# Patient Record
Sex: MALE | Race: WHITE | Employment: UNEMPLOYED | URBAN - METROPOLITAN AREA
[De-identification: names, ages, dates, MRNs, and addresses within clinical notes are randomized per-mention and may not be internally consistent; named-entity substitution may affect disease eponyms.]

---

## 2019-11-06 ENCOUNTER — HOSPITAL ENCOUNTER (OUTPATIENT)
Dept: LAB | Age: 59
Discharge: HOME | End: 2019-11-06

## 2019-11-06 ENCOUNTER — OFFICE VISIT (OUTPATIENT)
Dept: FAMILY MEDICINE CLINIC | Age: 59
End: 2019-11-06

## 2019-11-06 VITALS
DIASTOLIC BLOOD PRESSURE: 75 MMHG | WEIGHT: 315 LBS | SYSTOLIC BLOOD PRESSURE: 130 MMHG | HEART RATE: 86 BPM | TEMPERATURE: 98.4 F | OXYGEN SATURATION: 95 % | RESPIRATION RATE: 16 BRPM | BODY MASS INDEX: 44.1 KG/M2 | HEIGHT: 71 IN

## 2019-11-06 DIAGNOSIS — I10 ESSENTIAL HYPERTENSION: Primary | ICD-10-CM

## 2019-11-06 DIAGNOSIS — E66.01 OBESITY, MORBID (HCC): ICD-10-CM

## 2019-11-06 DIAGNOSIS — Z71.3 ENCOUNTER FOR WEIGHT LOSS COUNSELING: ICD-10-CM

## 2019-11-06 LAB
ALBUMIN SERPL-MCNC: 3.6 G/DL (ref 3.5–5)
ALBUMIN/GLOB SERPL: 0.9 {RATIO} (ref 1.1–2.2)
ALP SERPL-CCNC: 92 U/L (ref 45–117)
ALT SERPL-CCNC: 63 U/L (ref 12–78)
ANION GAP SERPL CALC-SCNC: 5 MMOL/L (ref 5–15)
AST SERPL-CCNC: 27 U/L (ref 15–37)
BILIRUB SERPL-MCNC: 0.4 MG/DL (ref 0.2–1)
BUN SERPL-MCNC: 12 MG/DL (ref 6–20)
BUN/CREAT SERPL: 13 (ref 12–20)
CALCIUM SERPL-MCNC: 9.5 MG/DL (ref 8.5–10.1)
CHLORIDE SERPL-SCNC: 104 MMOL/L (ref 97–108)
CHOLEST SERPL-MCNC: 184 MG/DL
CO2 SERPL-SCNC: 30 MMOL/L (ref 21–32)
CREAT SERPL-MCNC: 0.96 MG/DL (ref 0.7–1.3)
ERYTHROCYTE [DISTWIDTH] IN BLOOD BY AUTOMATED COUNT: 13.8 % (ref 11.5–14.5)
GLOBULIN SER CALC-MCNC: 3.8 G/DL (ref 2–4)
GLUCOSE SERPL-MCNC: 109 MG/DL (ref 65–100)
HCT VFR BLD AUTO: 43.8 % (ref 36.6–50.3)
HDLC SERPL-MCNC: 30 MG/DL
HDLC SERPL: 6.1 {RATIO} (ref 0–5)
HGB BLD-MCNC: 13.6 G/DL (ref 12.1–17)
LDLC SERPL CALC-MCNC: 107.4 MG/DL (ref 0–100)
LIPID PROFILE,FLP: ABNORMAL
MAGNESIUM SERPL-MCNC: 2.2 MG/DL (ref 1.6–2.4)
MCH RBC QN AUTO: 29.2 PG (ref 26–34)
MCHC RBC AUTO-ENTMCNC: 31.1 G/DL (ref 30–36.5)
MCV RBC AUTO: 94 FL (ref 80–99)
NRBC # BLD: 0 K/UL (ref 0–0.01)
NRBC BLD-RTO: 0 PER 100 WBC
PLATELET # BLD AUTO: 324 K/UL (ref 150–400)
PMV BLD AUTO: 10.6 FL (ref 8.9–12.9)
POTASSIUM SERPL-SCNC: 4.5 MMOL/L (ref 3.5–5.1)
PROT SERPL-MCNC: 7.4 G/DL (ref 6.4–8.2)
RBC # BLD AUTO: 4.66 M/UL (ref 4.1–5.7)
SODIUM SERPL-SCNC: 139 MMOL/L (ref 136–145)
TRIGL SERPL-MCNC: 233 MG/DL (ref ?–150)
TSH SERPL DL<=0.05 MIU/L-ACNC: 0.73 UIU/ML (ref 0.36–3.74)
URATE SERPL-MCNC: 5.8 MG/DL (ref 3.5–7.2)
VLDLC SERPL CALC-MCNC: 46.6 MG/DL
WBC # BLD AUTO: 7.7 K/UL (ref 4.1–11.1)

## 2019-11-06 PROCEDURE — 80061 LIPID PANEL: CPT

## 2019-11-06 PROCEDURE — 83735 ASSAY OF MAGNESIUM: CPT

## 2019-11-06 PROCEDURE — 85027 COMPLETE CBC AUTOMATED: CPT

## 2019-11-06 PROCEDURE — 84550 ASSAY OF BLOOD/URIC ACID: CPT

## 2019-11-06 PROCEDURE — 84443 ASSAY THYROID STIM HORMONE: CPT

## 2019-11-06 PROCEDURE — 80053 COMPREHEN METABOLIC PANEL: CPT

## 2019-11-06 NOTE — PROGRESS NOTES
Chief Complaint   Patient presents with    Other     discuss alternative for weight loss      Blood pressure 130/75, pulse 86, temperature 98.4 °F (36.9 °C), temperature source Oral, resp. rate 16, height 5' 11\" (1.803 m), weight 328 lb 6.4 oz (149 kg), SpO2 95 %. 1. Have you been to the ER, urgent care clinic since your last visit? Hospitalized since your last visit? No    2. Have you seen or consulted any other health care providers outside of the [address] [place] [address] since your last visit? Include any pap smears or colon screening.  No

## 2019-11-06 NOTE — PATIENT INSTRUCTIONS
Body Mass Index: Care Instructions  Your Care Instructions    Body mass index (BMI) can help you see if your weight is raising your risk for health problems. It uses a formula to compare how much you weigh with how tall you are. · A BMI lower than 18.5 is considered underweight. · A BMI between 18.5 and 24.9 is considered healthy. · A BMI between 25 and 29.9 is considered overweight. A BMI of 30 or higher is considered obese. If your BMI is in the normal range, it means that you have a lower risk for weight-related health problems. If your BMI is in the overweight or obese range, you may be at increased risk for weight-related health problems, such as high blood pressure, heart disease, stroke, arthritis or joint pain, and diabetes. If your BMI is in the underweight range, you may be at increased risk for health problems such as fatigue, lower protection (immunity) against illness, muscle loss, bone loss, hair loss, and hormone problems. BMI is just one measure of your risk for weight-related health problems. You may be at higher risk for health problems if you are not active, you eat an unhealthy diet, or you drink too much alcohol or use tobacco products. Follow-up care is a key part of your treatment and safety. Be sure to make and go to all appointments, and call your doctor if you are having problems. It's also a good idea to know your test results and keep a list of the medicines you take. How can you care for yourself at home? · Practice healthy eating habits. This includes eating plenty of fruits, vegetables, whole grains, lean protein, and low-fat dairy. · If your doctor recommends it, get more exercise. Walking is a good choice. Bit by bit, increase the amount you walk every day. Try for at least 30 minutes on most days of the week. · Do not smoke. Smoking can increase your risk for health problems. If you need help quitting, talk to your doctor about stop-smoking programs and medicines. These can increase your chances of quitting for good. · Limit alcohol to 2 drinks a day for men and 1 drink a day for women. Too much alcohol can cause health problems. If you have a BMI higher than 25  · Your doctor may do other tests to check your risk for weight-related health problems. This may include measuring the distance around your waist. A waist measurement of more than 40 inches in men or 35 inches in women can increase the risk of weight-related health problems. · Talk with your doctor about steps you can take to stay healthy or improve your health. You may need to make lifestyle changes to lose weight and stay healthy, such as changing your diet and getting regular exercise. If you have a BMI lower than 18.5  · Your doctor may do other tests to check your risk for health problems. · Talk with your doctor about steps you can take to stay healthy or improve your health. You may need to make lifestyle changes to gain or maintain weight and stay healthy, such as getting more healthy foods in your diet and doing exercises to build muscle. Where can you learn more? Go to http://micaela-андрей.info/. Enter S176 in the search box to learn more about \"Body Mass Index: Care Instructions. \"  Current as of: October 13, 2016  Content Version: 11.4  © 4565-0878 Healthwise, Incorporated. Care instructions adapted under license by [employer] (which disclaims liability or warranty for this information). If you have questions about a medical condition or this instruction, always ask your healthcare professional. [address] any warranty or liability for your use of this information.

## 2019-11-06 NOTE — PROGRESS NOTES
Chief Complaint   Patient presents with    Other     discuss alternative for weight loss        Subjective:   Alanna hCand is a 60 y.o. male here to discuss weight loss. He recently underwent spinal surgery at the [place] at the end of October. He states he was at the point of paralysis prior to this surgery and no the pain is improving and is able to ambulate with a cane. States  Max weight 340  Recent spinal surgery at end of October at [place]  Pain is improving  [facility] recommended bariatric surgery  Interested in medical assisted weight loss    Eating salsads and drinking lots of water    Past Medical History:   Diagnosis Date    Asthma     Asthma vs Reactive Airway Dz (diagnosed in [facility] following fugal respiratory infection).   Hx of abnormal chest -ray    Chronic back pain     MRI previously done 6/2013, Arthritis    Essential hypertension 6/13/2015    Hypogonadism male     Kidney stone     Low serum testosterone level     RANJITH (obstructive sleep apnea)     Wears a CPAP    Tinnitus      Past Surgical History:   Procedure Laterality Date    HX BACK SURGERY      10/21/19     Social History     Tobacco Use    Smoking status: Never Smoker    Smokeless tobacco: Never Used   Substance Use Topics    Alcohol use: No        Lab Results   Component Value Date/Time    WBC 7.7 11/06/2019 02:04 PM    HGB 13.6 11/06/2019 02:04 PM    HCT 43.8 11/06/2019 02:04 PM    PLATELET 354 77/69/2295 02:04 PM    MCV 94.0 11/06/2019 02:04 PM     Lab Results   Component Value Date/Time    Glucose 109 (H) 11/06/2019 02:04 PM    LDL, calculated 107.4 (H) 11/06/2019 02:04 PM    Creatinine 0.96 11/06/2019 02:04 PM      Lab Results   Component Value Date/Time    Cholesterol, total 184 11/06/2019 02:04 PM    HDL Cholesterol 30 11/06/2019 02:04 PM    LDL, calculated 107.4 (H) 11/06/2019 02:04 PM    Triglyceride 233 (H) 11/06/2019 02:04 PM    CHOL/HDL Ratio 6.1 (H) 11/06/2019 02:04 PM     Lab Results   Component Value Date/Time    ALT (SGPT) 63 11/06/2019 02:04 PM    AST (SGOT) 27 11/06/2019 02:04 PM    Alk. phosphatase 92 11/06/2019 02:04 PM    Bilirubin, total 0.4 11/06/2019 02:04 PM    Albumin 3.6 11/06/2019 02:04 PM    Protein, total 7.4 11/06/2019 02:04 PM    PLATELET 446 85/41/0207 02:04 PM     Lab Results   Component Value Date/Time    GFR est non-AA >60 11/06/2019 02:04 PM    GFR est AA >60 11/06/2019 02:04 PM    Creatinine 0.96 11/06/2019 02:04 PM    BUN 12 11/06/2019 02:04 PM    Sodium 139 11/06/2019 02:04 PM    Potassium 4.5 11/06/2019 02:04 PM    Chloride 104 11/06/2019 02:04 PM    CO2 30 11/06/2019 02:04 PM    Magnesium 2.2 11/06/2019 02:04 PM     Lab Results   Component Value Date/Time    TSH 0.73 11/06/2019 02:04 PM       No results found for: HBA1C, HGBE8, RWE1TTUM, ODC2IKJO      Review of Systems  A comprehensive review of systems was negative except for that written in the HPI. Objective:     Visit Vitals  /75   Pulse 86   Temp 98.4 °F (36.9 °C) (Oral)   Resp 16   Ht 5' 11\" (1.803 m)   Wt 328 lb 6.4 oz (149 kg)   SpO2 95%   BMI 45.80 kg/m²     Physical exam:   Physical Exam  Vitals signs reviewed. Constitutional:       General: He is not in acute distress. Appearance: He is well-developed. He is not diaphoretic. Comments: Morbidly obese   Eyes:      General: No scleral icterus. Conjunctiva/sclera: Conjunctivae normal.      Pupils: Pupils are equal, round, and reactive to light. Neck:      Musculoskeletal: Normal range of motion and neck supple. Thyroid: No thyromegaly. Cardiovascular:      Rate and Rhythm: Normal rate and regular rhythm. Heart sounds: Normal heart sounds. No murmur. No friction rub. No gallop. Pulmonary:      Effort: Pulmonary effort is normal. No respiratory distress. Breath sounds: Normal breath sounds. No wheezing, rhonchi or rales. Abdominal:      General: Bowel sounds are normal. There is no distension. Palpations: Abdomen is soft. Tenderness:  There is no tenderness. Musculoskeletal: Normal range of motion. General: Tenderness (back) present. Lymphadenopathy:      Cervical: No cervical adenopathy. Skin:     General: Skin is warm. Findings: No rash. Neurological:      Mental Status: He is alert and oriented to person, place, and time. Cranial Nerves: No cranial nerve deficit. Sensory: No sensory deficit. Motor: No abnormal muscle tone. Deep Tendon Reflexes: Reflexes are normal and symmetric. Psychiatric:         Behavior: Behavior normal.         Thought Content: Thought content normal.         Judgment: Judgment normal.           Assessment/Plan:     1. Essential hypertension  At goal BP today on 10mg lisinopril. 2. Obesity, morbid ([address]  Patient has multiple cco-morbid conditions, worsened by his obesity, including his chronic low back pain w/ associated neuropathy and hypertension. Is interested in medication-assisted weight loss. Discussed medical options. Based on his hx of hypertension, would like to avoid phentermine containing products. Does not have diabetes, though likely has some insulin-resistance. Reviewed products like Saxenda, however patient would like to trial oral medications initially. Will trial Contrave. Advised to keep dietary log between now and follow-up appointment. We will then be able to develop further smart goals. Overall goal is at least 10% weight loss (32lbs) ideally within the first 12 weeks of treatment. - naltrexone-buPROPion (CONTRAVE) 8-90 mg TbER ER tablet; Take 2 Tabs by mouth two (2) times a day. Week 1 1 tab PO QAM, Week 2 1QAM 1QHS, Week 3 2QAM 1 QHS, Week 4 & beyond 2QAM 2QHS  Dispense: 120 Tab; Refill: 1  - naltrexone-buPROPion (CONTRAVE) 8-90 mg TbER ER tablet;  First Month: Contrave 8mg/90mg #70  Week 1 : 1 Tab AM  Week 2 : 1 Tab AM, 1 Tab PM  Week 3 : 2 Tab AM, 1 Tab PM  Week 4 : 2 Tab AM, 2 Tab PM  Week 5 and Beyond: Contrave 8mg/90mg #120   2 Tab AM, 2 Tab PM  Dispense: 70 Tab; Refill: 0  - CBC W/O DIFF; Future  - METABOLIC PANEL, COMPREHENSIVE; Future  - URIC ACID; Future  - TSH 3RD GENERATION; Future  - LIPID PANEL; Future  - MAGNESIUM; Future    3. Encounter for weight loss counseling      I have discussed the diagnosis with the patient and the intended plan as seen in the above orders. he has expressed understanding. The patient has received an after-visit summary and questions were answered concerning future plans. I have discussed medication side effects and warnings with the patient as well. Follow-up and Dispositions    · Return in about 4 weeks (around 12/4/2019).          Electronically Signed: Regan St MD

## 2019-12-02 ENCOUNTER — TELEPHONE (OUTPATIENT)
Dept: FAMILY MEDICINE CLINIC | Age: 59
End: 2019-12-02

## 2019-12-02 DIAGNOSIS — Z71.3 ENCOUNTER FOR WEIGHT LOSS COUNSELING: ICD-10-CM

## 2019-12-02 DIAGNOSIS — E66.01 OBESITY, MORBID (HCC): Primary | ICD-10-CM

## 2019-12-11 NOTE — TELEPHONE ENCOUNTER
Dr. Kemar Rivera   Received: Today   Message Contents   Celia, Leticia Chalkokondili Message/Vendor Calls     Caller's first and last name: Melanie Jurado     Reason for call: waiting for Prior Authorization     Callback required yes/no and why: if necessary     Best contact number(s): [phone]     Details to clarify the request: Pt is waiting on prior authorization for Contrave and he wanted to know if he could get a Rx for Phentermine called into [facility] on file.      Maliha Mc

## 2019-12-11 NOTE — TELEPHONE ENCOUNTER
[facility] sent form to be completed for Contrave 8-90 mg. Placed form in Hayley's folder up front in black box.

## 2019-12-20 RX ORDER — PHENTERMINE HYDROCHLORIDE 37.5 MG/1
37.5 TABLET ORAL
Qty: 30 TAB | Refills: 0 | Status: SHIPPED | OUTPATIENT
Start: 2019-12-20 | End: 2020-01-08 | Stop reason: SDUPTHER

## 2019-12-20 NOTE — TELEPHONE ENCOUNTER
Please notify patient Rx sent to pharmacy. Will need to trial before Contrave. Will need to monitor blood pressure and notify the office if he is consistently above 140/90.

## 2019-12-23 ENCOUNTER — TELEPHONE (OUTPATIENT)
Dept: FAMILY MEDICINE CLINIC | Age: 59
End: 2019-12-23

## 2019-12-23 NOTE — TELEPHONE ENCOUNTER
----- Message from Raj Vallecillo sent at 12/23/2019  9:08 AM EST -----  Regarding: Dr. Bowen Novel: [id number] (if not patient): n/a  Relationship of caller (if not patient): n/a  Best contact number(s): [phone]  Name of medication and dosage if known: Pt forgot the name of the medication   Is patient out of this medication (yes/no): n/a  Pharmacy name: [facility] listed in chart? (yes/no): Yes  Pharmacy phone number: [phone]  Date of last visit: Wednesday, November 06, 2019 01:00 PM  Details to clarify the request: [facility] told pt to have his doctor contact them to verify prescription.

## 2020-02-03 NOTE — TELEPHONE ENCOUNTER
Patient aware his insurance wants him to try weight management for 6 months. Patient stated he does not want the medication anymore.

## 2020-02-10 ENCOUNTER — TELEPHONE (OUTPATIENT)
Dept: FAMILY MEDICINE CLINIC | Age: 60
End: 2020-02-10

## 2020-02-12 DIAGNOSIS — Z71.3 ENCOUNTER FOR WEIGHT LOSS COUNSELING: ICD-10-CM

## 2020-02-12 DIAGNOSIS — E66.01 OBESITY, MORBID (HCC): ICD-10-CM

## 2020-02-12 RX ORDER — PHENTERMINE HYDROCHLORIDE 37.5 MG/1
37.5 TABLET ORAL
Qty: 30 TAB | Refills: 0 | Status: SHIPPED | OUTPATIENT
Start: 2020-02-12 | End: 2020-03-16 | Stop reason: SDUPTHER

## 2020-02-12 NOTE — PROGRESS NOTES
Future Appointments   Date Time Provider Gabriel Maria Del Carmen   2/17/2020 11:10 AM Rahat Choi MD [facility] [address]     Refilled phentermine until patient able to follow-up.

## 2020-02-17 ENCOUNTER — OFFICE VISIT (OUTPATIENT)
Dept: FAMILY MEDICINE CLINIC | Age: 60
End: 2020-02-17

## 2020-02-17 VITALS
HEIGHT: 71 IN | WEIGHT: 315 LBS | TEMPERATURE: 98.2 F | OXYGEN SATURATION: 97 % | RESPIRATION RATE: 16 BRPM | BODY MASS INDEX: 44.1 KG/M2 | SYSTOLIC BLOOD PRESSURE: 139 MMHG | DIASTOLIC BLOOD PRESSURE: 76 MMHG | HEART RATE: 78 BPM

## 2020-02-17 DIAGNOSIS — Z71.3 ENCOUNTER FOR WEIGHT LOSS COUNSELING: Primary | ICD-10-CM

## 2020-02-17 DIAGNOSIS — E66.01 OBESITY, MORBID (HCC): ICD-10-CM

## 2020-02-17 RX ORDER — PREGABALIN 75 MG/1
CAPSULE ORAL
COMMUNITY
Start: 2020-02-13 | End: 2020-02-17 | Stop reason: ALTCHOICE

## 2020-02-17 RX ORDER — LISINOPRIL 20 MG/1
TABLET ORAL
COMMUNITY
Start: 2019-12-18

## 2020-02-17 RX ORDER — TIZANIDINE 4 MG/1
TABLET ORAL
COMMUNITY
Start: 2019-12-18

## 2020-02-17 RX ORDER — DULOXETIN HYDROCHLORIDE 30 MG/1
30 CAPSULE, DELAYED RELEASE ORAL DAILY
COMMUNITY

## 2020-02-17 NOTE — PROGRESS NOTES
Nasra Marmolejo  60 y.o. male  1960  Aria Porter  [facility] [address]: Progress Note  Sheyla Rea MD       Encounter Date: 2/17/2020    Chief Complaint   Patient presents with    Follow-up     weight management     History of Present Illness   Nasra Marmolejo is a 60 y.o. male who presents to clinic today for follow-up on weight. Had started phentermine since our last visit. States that he is down 8 lbs based on scale at home. He has cut out sodas, sweets and juices, increased salads, is eating 1-2 times day, eating chicken, fish, tuna and cut out breads. Trying to walk as far as he can, however he feels tingle and spikes in legs after a few blocks. Sees ortho next week. Pool more painful for him than walking. Wt Readings from Last 5 Encounters:   02/17/20 328 lb (148.8 kg)   11/06/19 328 lb 6.4 oz (149 kg)   10/10/16 327 lb (148.3 kg)   02/19/16 329 lb (149.2 kg)   07/28/15 305 lb 3.2 oz (138.4 kg)        Review of Systems   Review of Systems   Constitutional: Positive for weight loss. Negative for chills and fever. Eyes: Negative for blurred vision. Cardiovascular: Negative for chest pain, palpitations, orthopnea and leg swelling. Skin: Negative. Neurological: Negative for dizziness, tremors and headaches. Psychiatric/Behavioral: Negative for depression. The patient is not nervous/anxious. Vitals/Objective:     Vitals:    02/17/20 1101   BP: 139/76   Pulse: 78   Resp: 16   Temp: 98.2 °F (36.8 °C)   TempSrc: Oral   SpO2: 97%   Weight: 328 lb (148.8 kg)   Height: 5' 11\" (1.803 m)     Body mass index is 45.75 kg/m². General: Patient alert and oriented and in NAD  Heart: Regular rate and rhythm, No murmurs, rubs or gallops.   2+ peripheral pulses  Lungs: Clear to auscultation bilaterally, no wheezing, rales or rhonchi  Abd: +BS, non-tender, non-distended  Psych: Appropriate mood and affect      Assessment and Plan:   1. Encounter for weight loss counseling  Mr. Tylor Chen is being to make significant lifestyle changes that will be beneficial for his long term health. Most of these changes surround food, as his physical activity is limited by his back and leg pain. He will see ortho at the [place] shortly to address this. We discussed chair aerobics/exercises as a possible alternative to more intensive physical activity. Encouraged to continue his dietary changes. While weight here in clinic does not reflect any weight loss, he reports 8lbs since starting this medication. Advised setting SMART goals between now and the next visit and to work towards them. Has phentermine refill sent in on 2/12/2020. Will follow-up in 3-4 weeks. Discussed that if we do not have 10% weight loss within the first 12 months, we may need to look into alternative medications to assist in weight loss. SMART GOALS:    1. Track your eating over the next few weeks:  Mary Wang, or Norman    2. Increase physical activity to 3x/week over the next 4 weeks. 2. Obesity, morbid ([address]  Body mass index is 45.75 kg/m². I have discussed the diagnosis with the patient and the intended plan as seen in the above orders. he has expressed understanding. The patient has received an after-visit summary and questions were answered concerning future plans. I have discussed medication side effects and warnings with the patient as well. Electronically Signed: Alec Waldron MD     History   Patients past medical, surgical and family histories were reviewed and updated. Past Medical History:   Diagnosis Date    Asthma     Asthma vs Reactive Airway Dz (diagnosed in [facility] following fugal respiratory infection).   Hx of abnormal chest -ray    Chronic back pain     MRI previously done 6/2013, Arthritis    Essential hypertension 6/13/2015    Hypogonadism male     Kidney stone     Low serum testosterone level     RANJITH (obstructive sleep apnea)     Wears a CPAP    Tinnitus      Past Surgical History:   Procedure Laterality Date    HX BACK SURGERY      10/21/19     Family History   Problem Relation Age of Onset    Cancer Mother         ? Breast Cancer    Cancer Maternal Uncle         Prostate    Cancer Maternal Uncle         Colon Cancer    Cancer Maternal Uncle         Throat Cancer     Social History     Socioeconomic History    Marital status:      Spouse name: Rudy    Number of children: 3    Years of education: 12    Highest education level: Not on file   Occupational History    Occupation:      Comment: Stopped due to pain    Occupation: [employer]: Retired   Social Needs    Financial resource strain: Not on file    Food insecurity:     Worry: Not on file     Inability: Not on file   BUKA needs:     Medical: Not on file     Non-medical: Not on file   Tobacco Use    Smoking status: Never Smoker    Smokeless tobacco: Never Used   Substance and Sexual Activity    Alcohol use: No    Drug use: No    Sexual activity: Yes     Partners: Female   Lifestyle    Physical activity:     Days per week: Not on file     Minutes per session: Not on file    Stress: Not on file   Relationships    Social connections:     Talks on phone: Not on file     Gets together: Not on file     Attends Zoroastrianism service: Not on file     Active member of club or organization: Not on file     Attends meetings of clubs or organizations: Not on file     Relationship status: Not on file    Intimate partner violence:     Fear of current or ex partner: Not on file     Emotionally abused: Not on file     Physically abused: Not on file     Forced sexual activity: Not on file   Other Topics Concern    Not on file   Social History Narrative    ** Merged History Encounter **         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 Max Daily Amount: 37.5 mg. 30 Tab 0    lisinopril (PRINIVIL, ZESTRIL) 10 mg tablet TAKE 1 TABLET DAILY 90 Tab 2    fluticasone (FLONASE) 50 mcg/actuation nasal spray 2 Sprays by Both Nostrils route daily. 1 Bottle 4    albuterol (PROVENTIL HFA, VENTOLIN HFA, PROAIR HFA) 90 mcg/actuation inhaler Take 2 Puffs by inhalation every four (4) hours as needed for Wheezing. 1 Inhaler 2    cetirizine (ZYRTEC) 5 mg tablet Take 1 Tab by mouth daily. 30 Tab 0    gabapentin (NEURONTIN) 300 mg capsule Take 600 mg by mouth daily.        No Known Allergies

## 2020-02-17 NOTE — PROGRESS NOTES
Chief Complaint   Patient presents with    Medication Refill     phentermine     1. Have you been to the ER, urgent care clinic since your last visit? Hospitalized since your last visit? No    2. Have you seen or consulted any other health care providers outside of the [address] [place] [address] since your last visit? Include any pap smears or colon screening.  No

## 2020-02-17 NOTE — PATIENT INSTRUCTIONS
SMART GOALS:    1. Track your eating over the next few weeks:  Liang Buckley or Norman    2. Increase physical activity to 3x/week over the next 4 weeks.

## 2020-03-02 ENCOUNTER — TELEPHONE (OUTPATIENT)
Dept: FAMILY MEDICINE CLINIC | Age: 60
End: 2020-03-02

## 2020-03-02 NOTE — TELEPHONE ENCOUNTER
I called and spoke with patient and let him know to call back regarding medication request. I was unsure of what medication patient was needing. Expecting a call back.

## 2020-03-02 NOTE — TELEPHONE ENCOUNTER
Patient states he need refill of his\" stendra\" for erectile dysfunction. I don't see this medication on list and patient states Dr. Ender Flanagan has prescribed before.

## 2020-03-02 NOTE — TELEPHONE ENCOUNTER
Patient was given this medication my Dr Sandra Goetz and doesn't appear to be a current med and let him know that it is appropriate to reach out to him regarding refill. I did let him know that it would be a good idea to get a medical release of records so we have most current notes from that practice.

## 2020-03-16 DIAGNOSIS — Z71.3 ENCOUNTER FOR WEIGHT LOSS COUNSELING: ICD-10-CM

## 2020-03-16 DIAGNOSIS — E66.01 OBESITY, MORBID (HCC): ICD-10-CM

## 2020-03-16 RX ORDER — PHENTERMINE HYDROCHLORIDE 37.5 MG/1
37.5 TABLET ORAL
Qty: 30 TAB | Refills: 0 | Status: SHIPPED | OUTPATIENT
Start: 2020-03-16 | End: 2020-04-18

## 2020-03-16 NOTE — TELEPHONE ENCOUNTER
----- Message from Terrell Art sent at 3/16/2020  9:02 AM EDT -----  Regarding: Dr. Saravanan Robbins  Medication Refill    Caller (if not patient):      Relationship of caller (if not patient):      Best contact number(s):[phone]      Name of medication and dosage if known:\"Phentermine\" 37.5 mg      Is patient out of this medication (yes/no):yes      [facility] listed in chart? (yes/no):yes  Pharmacy phone number:      Details to clarify the request: Pt requested a refill on his Rx, and stated he is out of medication.       Terrell Art

## 2020-04-17 DIAGNOSIS — Z71.3 ENCOUNTER FOR WEIGHT LOSS COUNSELING: ICD-10-CM

## 2020-04-17 DIAGNOSIS — E66.01 OBESITY, MORBID (HCC): ICD-10-CM

## 2020-04-18 RX ORDER — PHENTERMINE HYDROCHLORIDE 37.5 MG/1
TABLET ORAL
Qty: 30 TAB | Refills: 0 | Status: SHIPPED | OUTPATIENT
Start: 2020-04-18 | End: 2020-06-01 | Stop reason: SDUPTHER

## 2020-04-18 NOTE — TELEPHONE ENCOUNTER
Temporary refill provided. Please have the patient schedule a telemedicine visit within hte next 2 weeks for follow-up on weight loss.

## 2020-04-20 ENCOUNTER — TELEPHONE (OUTPATIENT)
Dept: FAMILY MEDICINE CLINIC | Age: 60
End: 2020-04-20

## 2020-04-20 DIAGNOSIS — N52.9 ERECTILE DYSFUNCTION, UNSPECIFIED ERECTILE DYSFUNCTION TYPE: Primary | ICD-10-CM

## 2020-04-20 NOTE — TELEPHONE ENCOUNTER
[phone]    Patient called for a refill of stendra. He said Dr. Valery Benavides has filled it before for him but the doctor wanted his Urologist to order this medication. He is now saying the Urologist said his provider needs to order it.

## 2020-05-28 DIAGNOSIS — E66.01 OBESITY, MORBID (HCC): ICD-10-CM

## 2020-05-28 DIAGNOSIS — Z71.3 ENCOUNTER FOR WEIGHT LOSS COUNSELING: ICD-10-CM

## 2020-05-28 RX ORDER — PHENTERMINE HYDROCHLORIDE 37.5 MG/1
TABLET ORAL
Qty: 30 TAB | Refills: 0 | OUTPATIENT
Start: 2020-05-28

## 2020-05-28 NOTE — TELEPHONE ENCOUNTER
----- Message from Ceci Dior sent at 5/28/2020  9:09 AM EDT -----  Regarding: Dr Rangel Prophet: [phone]  Medication Refill    Caller (if not patient):      Relationship of caller (if not patient):      Best contact number(s):[phone]      Name of medication and dosage if known:Phentermine 37 mg      Is patient out of this medication (yes/no):yes      Pharmacy name: [place] listed in chart? (yes/no):yes  Pharmacy phone number:      Details to clarify the request:      Ceci Dior

## 2020-05-29 NOTE — TELEPHONE ENCOUNTER
Called patient and scheduled him for:   The following appointments have been successfully scheduled:    Date/time Monday, June 01, 2020 09:15 AM  Patient Savi Johnsonchasity 1960 (16BN Jac Day) #[id number] #[id number]  Department [facility]  Appointment type Telemedicine 30 My Chart  Provider [address]  Appointment type notes Telemedicine 30 My Chart  For visits related to COVID    Close enc

## 2020-06-01 ENCOUNTER — VIRTUAL VISIT (OUTPATIENT)
Dept: FAMILY MEDICINE CLINIC | Age: 60
End: 2020-06-01

## 2020-06-01 DIAGNOSIS — Z71.3 ENCOUNTER FOR WEIGHT LOSS COUNSELING: ICD-10-CM

## 2020-06-01 DIAGNOSIS — E66.01 OBESITY, MORBID (HCC): Primary | ICD-10-CM

## 2020-06-01 RX ORDER — PHENTERMINE HYDROCHLORIDE 37.5 MG/1
TABLET ORAL
Qty: 30 TAB | Refills: 0 | Status: SHIPPED | OUTPATIENT
Start: 2020-06-01 | End: 2021-02-22 | Stop reason: SDUPTHER

## 2020-06-01 NOTE — PROGRESS NOTES
Conner Reagan  60 y.o. male  1960  [place]  [id number]   [address]:    Telemedicine Progress Note  Joe Cage MD       Encounter Date and Time: June 1, 2020 at 10:00 AM    Consent: Conner Reagan, who was seen by synchronous (real-time) audio-video technology, and/or his healthcare decision maker, is aware that this patient-initiated, Telehealth encounter on 6/1/2020 is a billable service, with coverage as determined by his insurance carrier. He is aware that he may receive a bill and has provided verbal consent to proceed: Yes. Chief Complaint   Patient presents with    Weight Loss     History of Present Illness   Conner Reagan is a 60 y.o. male was evaluated by synchronous (real-time) audio-video technology from home, through a secure patient portal.    For follow-up on weight loss. Was taking medication and lost 20 lbs, but then felt like things plateau. Skipping meals, eating salad doing well. Ran out of medication last week. No side-effects from medication. Blood pressure has been well controlled (128/80 this AM). Since stopping medication he has noticed more cravings and feels like he is eat more. Last weight was 305 lbs    Review of Systems   Review of Systems   Cardiovascular: Negative for chest pain and palpitations. Psychiatric/Behavioral: The patient is not nervous/anxious. Vitals/Objective:     General: alert, cooperative, no distress   Mental  status: mental status: alert, oriented to person, place, and time, normal mood, behavior, speech, dress, motor activity, and thought processes   Resp: resp: normal effort and no respiratory distress   Neuro: neuro: no gross deficits   Skin: skin: no discoloration or lesions of concern on visible areas   Due to this being a TeleHealth evaluation, many elements of the physical examination are unable to be assessed. Assessment and Plan:   1. Obesity, morbid ([facility] [address]  2. Encounter for weight loss counseling  For weight loss of 20 pounds since starting phentermine. He had reached a plateau for several weeks without losing any weight at all despite dietary and lifestyle changes. He has been out of his medication for 1 week. Blood pressure seems well controlled based on his report. Recommend the patient take a medication holiday for a total of 4 weeks, restarting his phentermine on June 21st.  Reviewed mindful eating habits. Discussed meal planning and portion control. If after restarting the medication we failed to lose any additional weight, will alternative options will be considered at that time. We discussed the expected course, resolution and complications of the diagnosis(es) in detail. Medication risks, benefits, costs, interactions, and alternatives were discussed as indicated. I advised him to contact the office if his condition worsens, changes or fails to improve as anticipated. He expressed understanding with the diagnosis(es) and plan. Patient understands that this encounter was a temporary measure, and the importance of further follow up and examination was emphasized. Patient verbalized understanding. Patient informed to follow up: 4 weeks after restarting medication    Electronically Signed: Buddy Elliott MD    Jayson Erazo is a 60 y.o. male who was evaluated by an audio-video encounter for concerns as above. Patient identification was verified prior to start of the visit. A caregiver was present when appropriate. Due to this being a TeleHealth encounter (During EUY-43 public health emergency), evaluation of the following organ systems was limited: Vitals/Constitutional/EENT/Resp/CV/GI//MS/Neuro/Skin/Heme-Lymph-Imm.   Pursuant to the emergency declaration under the [facility], 1135 waiver authority and the [employer] Appropriations Act, this Virtual Visit was conducted, with patient's (and/or legal guardian's) consent, to reduce the patient's risk of exposure to COVID-19 and provide necessary medical care. Services were provided through a synchronous discussion virtually to substitute for in-person clinic visit. I was in the office. The patient was at home. History   Patients past medical, surgical and family histories were reviewed and updated. Past Medical History:   Diagnosis Date    Asthma     Asthma vs Reactive Airway Dz (diagnosed in [facility] following fugal respiratory infection). Hx of abnormal chest -ray    Chronic back pain     MRI previously done 6/2013, Arthritis    Essential hypertension 6/13/2015    Hypogonadism male     Kidney stone     Low serum testosterone level     RANJITH (obstructive sleep apnea)     Wears a CPAP    Tinnitus      Past Surgical History:   Procedure Laterality Date    HX BACK SURGERY      10/21/19     Family History   Problem Relation Age of Onset    Cancer Mother         ? Breast Cancer    Cancer Maternal Uncle         Prostate    Cancer Maternal Uncle         Colon Cancer    Cancer Maternal Uncle         Throat Cancer     Social History     Socioeconomic History    Marital status:      Spouse name: Rudy    Number of children: 3    Years of education: 12    Highest education level: Not on file   Occupational History    Occupation:      Comment: Stopped due to pain    Occupation: [employer]: Retired   Social Needs    Financial resource strain: Not on file    Food insecurity     Worry: Not on file     Inability: Not on file   English [email or web address] needs     Medical: Not on file     Non-medical: Not on file   Tobacco Use    Smoking status: Never Smoker    Smokeless tobacco: Never Used   Substance and Sexual Activity    Alcohol use: No    Drug use: No    Sexual activity: Yes     Partners: Female   Lifestyle    Physical activity Days per week: Not on file     Minutes per session: Not on file    Stress: Not on file   Relationships    Social connections     Talks on phone: Not on file     Gets together: Not on file     Attends Caodaism service: Not on file     Active member of club or organization: Not on file     Attends meetings of clubs or organizations: Not on file     Relationship status: Not on file    Intimate partner violence     Fear of current or ex partner: Not on file     Emotionally abused: Not on file     Physically abused: Not on file     Forced sexual activity: Not on file   Other Topics Concern    Not on file   Social History Narrative    ** Merged History Encounter **         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 [address] E66.01          Current Medications/Allergies   Medications and Allergies reviewed:    Current Outpatient Medications   Medication Sig Dispense Refill    avanafiL (STENDRA) 50 mg tablet Take 1 Tab by mouth as needed for Other. 5 Tab 2    phentermine (ADIPEX-P) 37.5 mg tablet TAKE ONE TABLET BY MOUTH EVERY MORNING **MAX DAILY AMOUNT IS ONE TABLET** 30 Tab 0    tiZANidine (ZANAFLEX) 4 mg tablet       lisinopril (PRINIVIL, ZESTRIL) 20 mg tablet       DULoxetine (CYMBALTA) 30 mg capsule Take 30 mg by mouth daily.  fluticasone (FLONASE) 50 mcg/actuation nasal spray 2 Sprays by Both Nostrils route daily. 1 Bottle 4    albuterol (PROVENTIL HFA, VENTOLIN HFA, PROAIR HFA) 90 mcg/actuation inhaler Take 2 Puffs by inhalation every four (4) hours as needed for Wheezing.  1 Inhaler 2    cetirizine (ZYRTEC) 5 mg tablet Take 1 Tab by mouth daily.  30 Tab 0     No Known Allergies

## 2020-07-22 ENCOUNTER — HOSPITAL ENCOUNTER (OUTPATIENT)
Dept: MRI IMAGING | Age: 60
Discharge: HOME | End: 2020-07-22
Attending: PHYSICIAN ASSISTANT
Payer: OTHER GOVERNMENT

## 2020-07-22 DIAGNOSIS — M79.604 LOW BACK PAIN RADIATING TO BOTH LEGS: ICD-10-CM

## 2020-07-22 DIAGNOSIS — M79.605 LOW BACK PAIN RADIATING TO BOTH LEGS: ICD-10-CM

## 2020-07-22 DIAGNOSIS — M54.50 LOW BACK PAIN RADIATING TO BOTH LEGS: ICD-10-CM

## 2020-07-22 PROCEDURE — 72158 MRI LUMBAR SPINE W/O & W/DYE: CPT

## 2020-07-22 PROCEDURE — A9575 INJ GADOTERATE MEGLUMI 0.1ML: HCPCS

## 2020-07-22 PROCEDURE — 74011250636 HC RX REV CODE- 250/636

## 2020-07-22 RX ORDER — GADOTERATE MEGLUMINE 376.9 MG/ML
20 INJECTION INTRAVENOUS
Status: COMPLETED | OUTPATIENT
Start: 2020-07-22 | End: 2020-07-22

## 2020-07-22 RX ADMIN — GADOTERATE MEGLUMINE 20 ML: 376.9 INJECTION INTRAVENOUS at 09:18

## 2021-02-08 DIAGNOSIS — E66.01 OBESITY, MORBID (HCC): ICD-10-CM

## 2021-02-08 DIAGNOSIS — Z71.3 ENCOUNTER FOR WEIGHT LOSS COUNSELING: ICD-10-CM

## 2021-02-08 RX ORDER — PHENTERMINE HYDROCHLORIDE 37.5 MG/1
TABLET ORAL
Qty: 30 TAB | Refills: 0 | OUTPATIENT
Start: 2021-02-08

## 2021-02-22 ENCOUNTER — VIRTUAL VISIT (OUTPATIENT)
Dept: FAMILY MEDICINE CLINIC | Age: 61
End: 2021-02-22
Payer: OTHER GOVERNMENT

## 2021-02-22 DIAGNOSIS — R79.89 LOW SERUM TESTOSTERONE LEVEL: ICD-10-CM

## 2021-02-22 DIAGNOSIS — Z71.3 ENCOUNTER FOR WEIGHT LOSS COUNSELING: ICD-10-CM

## 2021-02-22 DIAGNOSIS — I10 ESSENTIAL HYPERTENSION: ICD-10-CM

## 2021-02-22 DIAGNOSIS — E66.01 OBESITY, MORBID (HCC): Primary | ICD-10-CM

## 2021-02-22 PROCEDURE — 99214 OFFICE O/P EST MOD 30 MIN: CPT | Performed by: FAMILY MEDICINE

## 2021-02-22 RX ORDER — PHENTERMINE HYDROCHLORIDE 37.5 MG/1
TABLET ORAL
Qty: 30 TAB | Refills: 0 | Status: SHIPPED | OUTPATIENT
Start: 2021-02-22 | End: 2021-03-15 | Stop reason: SDUPTHER

## 2021-02-22 NOTE — PROGRESS NOTES
Briseida Paz  63 y.o. male  1960  [address] [facility]  [id number]   [address]:    Telemedicine Progress Note  Rosario Su MD       Encounter Date and Time: February 22, 2021 at 11:37 AM    Consent: Briseida Paz, who was seen by synchronous (real-time) audio-video technology, and/or his healthcare decision maker, is aware that this patient-initiated, Telehealth encounter on 2/22/2021 is a billable service, with coverage as determined by his insurance carrier. He is aware that he may receive a bill and has provided verbal consent to proceed: Yes. Chief Complaint   Patient presents with    Weight Loss     History of Present Illness   Briseida Paz is a 63 y.o. male was evaluated by synchronous (real-time) audio-video technology from home, through a secure patient portal.    Follow-up for weight loss. Last seen 6/1/2020. Took medication for 1 month and noted significant improvement, but fell into old habits over the holidays. He is ready to restart his weight loss journey. His physical activity is limited by his chronic back pain. Also medication choices were somewhat limited by HTN and cost of other medications.        No results found for: HBA1C, HGBE8, JVX4VKPN, MQV8LOWL     Lab Results   Component Value Date/Time    Cholesterol, total 184 11/06/2019 02:04 PM    HDL Cholesterol 30 11/06/2019 02:04 PM    LDL, calculated 107.4 (H) 11/06/2019 02:04 PM    VLDL, calculated 46.6 11/06/2019 02:04 PM    Triglyceride 233 (H) 11/06/2019 02:04 PM    CHOL/HDL Ratio 6.1 (H) 11/06/2019 02:04 PM     Lab Results   Component Value Date/Time    Sodium 139 11/06/2019 02:04 PM    Potassium 4.5 11/06/2019 02:04 PM    Chloride 104 11/06/2019 02:04 PM    CO2 30 11/06/2019 02:04 PM    Anion gap 5 11/06/2019 02:04 PM    Glucose 109 (H) 11/06/2019 02:04 PM    BUN 12 11/06/2019 02:04 PM    Creatinine 0.96 11/06/2019 02:04 PM    BUN/Creatinine ratio 13 11/06/2019 02:04 PM    GFR est AA >60 11/06/2019 02:04 PM    GFR est non-AA >60 11/06/2019 02:04 PM    Calcium 9.5 11/06/2019 02:04 PM     Lab Results   Component Value Date/Time    TSH 0.73 11/06/2019 02:04 PM       Review of Systems   Review of Systems   Constitutional: Negative. Cardiovascular: Negative. Gastrointestinal: Negative. Vitals/Objective:     Patient-Reported Vitals 2/22/2021   Patient-Reported Weight 345lbs      General: alert, cooperative, no distress   Mental  status: mental status: alert, oriented to person, place, and time, normal mood, behavior, speech, dress, motor activity, and thought processes   Resp: resp: normal effort and no respiratory distress   Neuro: neuro: no gross deficits   Skin: skin: no discoloration or lesions of concern on visible areas   Due to this being a TeleHealth evaluation, many elements of the physical examination are unable to be assessed. Assessment and Plan:   1. Obesity, morbid ([facility] [address]  Reviewed medically assisted weight loss therapy and the importance of lifestyle modifications to help loose and maintain weight loss. Discussed risks and benefits of weight loss medications, including those of phentermine. Discussed importance of good blood pressure controll with this medication. Labs placed to be drawn prior to next appointment. Phentermine refilled. SMART goals set. - phentermine (ADIPEX-P) 37.5 mg tablet; TAKE ONE TABLET BY MOUTH EVERY MORNING **MAX DAILY AMOUNT IS ONE TABLET**  Dispense: 30 Tab; Refill: 0  - HEMOGLOBIN A1C WITH EAG; Future  - TSH 3RD GENERATION; Future  - URIC ACID; Future  - VITAMIN B12 & FOLATE; Future  - VITAMIN D, 25 HYDROXY; Future    2. Encounter for weight loss counseling  - phentermine (ADIPEX-P) 37.5 mg tablet; TAKE ONE TABLET BY MOUTH EVERY MORNING **MAX DAILY AMOUNT IS ONE TABLET**  Dispense: 30 Tab; Refill: 0    3. Essential hypertension  BP have been controlled, however borderline at last check 1 year ago. Given virtual visits, no recorded pressures since. Advise to get a BP cuff with appropriate sized cuff for his arm.   - CBC W/O DIFF; Future  - METABOLIC PANEL, COMPREHENSIVE; Future  - LIPID PANEL; Future  - HEMOGLOBIN A1C WITH EAG; Future    4. Low serum testosterone level  Hx of Low T and failed treatment with testosterone. Given morbid obesity, will follow-up on levels and may consider revisiting treatment if indicated. - TESTOSTERONE, TOTAL, ADULT MALE; Future  - PSA W/ REFLX FREE PSA; Future      We discussed the expected course, resolution and complications of the diagnosis(es) in detail. Medication risks, benefits, costs, interactions, and alternatives were discussed as indicated. I advised him to contact the office if his condition worsens, changes or fails to improve as anticipated. He expressed understanding with the diagnosis(es) and plan. Patient understands that this virtual encounter was a temporary measure, and the importance of further follow up and examination was emphasized. Patient verbalized understanding. Patient informed to follow up: 4 weeks. Electronically Signed: Chidi Garcia MD    Yovany Petty is a 63 y.o. male who was evaluated by an audio-video encounter for concerns as above. Patient identification was verified prior to start of the visit. A caregiver was present when appropriate. Due to this being a TeleHealth encounter (During KXAOK-15 public health emergency), evaluation of the following organ systems was limited: Vitals/Constitutional/EENT/Resp/CV/GI//MS/Neuro/Skin/Heme-Lymph-Imm. Pursuant to the emergency declaration under the [facility], 1135 waiver authority and the [employer] Act, this Virtual Visit was conducted, with patient's (and/or legal guardian's) consent, to reduce the patient's risk of exposure to COVID-19 and provide necessary medical care. Services were provided through a synchronous discussion virtually to substitute for in-person clinic visit. I was at home. The patient was at home. History   Patients past medical, surgical and family histories were reviewed and updated. Past Medical History:   Diagnosis Date    Asthma     Asthma vs Reactive Airway Dz (diagnosed in [facility] following fugal respiratory infection). Hx of abnormal chest -ray    Chronic back pain     MRI previously done 6/2013, Arthritis    Essential hypertension 6/13/2015    Hypogonadism male     Kidney stone     Low serum testosterone level     RANJITH (obstructive sleep apnea)     Wears a CPAP    Tinnitus      Past Surgical History:   Procedure Laterality Date    HX BACK SURGERY      10/21/19     Family History   Problem Relation Age of Onset    Cancer Mother         ? Breast Cancer    Cancer Maternal Uncle         Prostate    Cancer Maternal Uncle         Colon Cancer    Cancer Maternal Uncle         Throat Cancer     Social History     Tobacco Use    Smoking status: Never Smoker    Smokeless tobacco: Never Used   Substance Use Topics    Alcohol use: No    Drug use: No     Patient Active Problem List   Diagnosis Code    Chronic back pain M54.9, G89.29    Low serum testosterone level R79.89    Hypogonadism male E29.1    Radicular pain M54.10    Chronic sinusitis J32.9    Fatigue R53.83    Hearing loss H91.90    Cellulitis L03.90    Essential hypertension I10    Erectile dysfunction N52.9    Obesity, morbid (HCC) E66.01          Current Medications/Allergies   Medications and Allergies reviewed:    Current Outpatient Medications   Medication Sig Dispense Refill    phentermine (ADIPEX-P) 37.5 mg tablet TAKE ONE TABLET BY MOUTH EVERY MORNING **MAX DAILY AMOUNT IS ONE TABLET** 30 Tab 0    avanafiL (STENDRA) 50 mg tablet Take 1 Tab by mouth as needed for Other.  5 Tab 2    tiZANidine (ZANAFLEX) 4 mg tablet       lisinopril (PRINIVIL, ZESTRIL) 20 mg tablet       DULoxetine (CYMBALTA) 30 mg capsule Take 30 mg by mouth daily.  fluticasone (FLONASE) 50 mcg/actuation nasal spray 2 Sprays by Both Nostrils route daily. 1 Bottle 4    albuterol (PROVENTIL HFA, VENTOLIN HFA, PROAIR HFA) 90 mcg/actuation inhaler Take 2 Puffs by inhalation every four (4) hours as needed for Wheezing. 1 Inhaler 2    cetirizine (ZYRTEC) 5 mg tablet Take 1 Tab by mouth daily.  30 Tab 0     No Known Allergies

## 2021-02-22 NOTE — Clinical Note
Patient needs early morning (between 8-9AM) fasting lab appointment the week of 3/8. Can you call to schedule?

## 2021-03-08 ENCOUNTER — LAB ONLY (OUTPATIENT)
Dept: FAMILY MEDICINE CLINIC | Age: 61
End: 2021-03-08

## 2021-03-08 DIAGNOSIS — I10 ESSENTIAL HYPERTENSION: ICD-10-CM

## 2021-03-08 DIAGNOSIS — E66.01 OBESITY, MORBID (HCC): ICD-10-CM

## 2021-03-08 DIAGNOSIS — R79.89 LOW SERUM TESTOSTERONE LEVEL: ICD-10-CM

## 2021-03-09 LAB
25(OH)D3 SERPL-MCNC: 27.9 NG/ML (ref 30–100)
ALBUMIN SERPL-MCNC: 4 G/DL (ref 3.5–5)
ALBUMIN/GLOB SERPL: 1.2 {RATIO} (ref 1.1–2.2)
ALP SERPL-CCNC: 96 U/L (ref 45–117)
ALT SERPL-CCNC: 77 U/L (ref 12–78)
ANION GAP SERPL CALC-SCNC: 3 MMOL/L (ref 5–15)
AST SERPL-CCNC: 37 U/L (ref 15–37)
BILIRUB SERPL-MCNC: 0.5 MG/DL (ref 0.2–1)
BUN SERPL-MCNC: 15 MG/DL (ref 6–20)
BUN/CREAT SERPL: 14 (ref 12–20)
CALCIUM SERPL-MCNC: 8.9 MG/DL (ref 8.5–10.1)
CHLORIDE SERPL-SCNC: 102 MMOL/L (ref 97–108)
CHOLEST SERPL-MCNC: 180 MG/DL
CO2 SERPL-SCNC: 31 MMOL/L (ref 21–32)
CREAT SERPL-MCNC: 1.08 MG/DL (ref 0.7–1.3)
ERYTHROCYTE [DISTWIDTH] IN BLOOD BY AUTOMATED COUNT: 14.7 % (ref 11.5–14.5)
EST. AVERAGE GLUCOSE BLD GHB EST-MCNC: 126 MG/DL
FOLATE SERPL-MCNC: 18.7 NG/ML (ref 5–21)
GLOBULIN SER CALC-MCNC: 3.4 G/DL (ref 2–4)
GLUCOSE SERPL-MCNC: 79 MG/DL (ref 65–100)
HBA1C MFR BLD: 6 % (ref 4–5.6)
HCT VFR BLD AUTO: 45.7 % (ref 36.6–50.3)
HDLC SERPL-MCNC: 36 MG/DL
HDLC SERPL: 5 {RATIO} (ref 0–5)
HGB BLD-MCNC: 14.3 G/DL (ref 12.1–17)
LDLC SERPL CALC-MCNC: 119 MG/DL (ref 0–100)
LIPID PROFILE,FLP: ABNORMAL
MCH RBC QN AUTO: 29.3 PG (ref 26–34)
MCHC RBC AUTO-ENTMCNC: 31.3 G/DL (ref 30–36.5)
MCV RBC AUTO: 93.6 FL (ref 80–99)
NRBC # BLD: 0 K/UL (ref 0–0.01)
NRBC BLD-RTO: 0 PER 100 WBC
PLATELET # BLD AUTO: 196 K/UL (ref 150–400)
PMV BLD AUTO: 11.7 FL (ref 8.9–12.9)
POTASSIUM SERPL-SCNC: 4.7 MMOL/L (ref 3.5–5.1)
PROT SERPL-MCNC: 7.4 G/DL (ref 6.4–8.2)
RBC # BLD AUTO: 4.88 M/UL (ref 4.1–5.7)
SODIUM SERPL-SCNC: 136 MMOL/L (ref 136–145)
TRIGL SERPL-MCNC: 125 MG/DL (ref ?–150)
TSH SERPL DL<=0.05 MIU/L-ACNC: 0.74 UIU/ML (ref 0.36–3.74)
URATE SERPL-MCNC: 6.8 MG/DL (ref 3.5–7.2)
VIT B12 SERPL-MCNC: 741 PG/ML (ref 193–986)
VLDLC SERPL CALC-MCNC: 25 MG/DL
WBC # BLD AUTO: 6.7 K/UL (ref 4.1–11.1)

## 2021-03-10 LAB
COMMENT, TESC2: ABNORMAL
PSA SERPL-MCNC: 0.6 NG/ML (ref 0–4)
REFLEX CRITERIA: NORMAL
TESTOST SERPL-MCNC: 156 NG/DL (ref 264–916)

## 2021-03-15 ENCOUNTER — VIRTUAL VISIT (OUTPATIENT)
Dept: FAMILY MEDICINE CLINIC | Age: 61
End: 2021-03-15

## 2021-03-15 DIAGNOSIS — E66.01 OBESITY, MORBID (HCC): ICD-10-CM

## 2021-03-15 DIAGNOSIS — Z71.3 ENCOUNTER FOR WEIGHT LOSS COUNSELING: ICD-10-CM

## 2021-03-15 PROCEDURE — 99443 PR PHYS/QHP TELEPHONE EVALUATION 21-30 MIN: CPT | Performed by: FAMILY MEDICINE

## 2021-03-15 RX ORDER — PHENTERMINE HYDROCHLORIDE 37.5 MG/1
TABLET ORAL
Qty: 30 TAB | Refills: 0 | Status: SHIPPED | OUTPATIENT
Start: 2021-03-15

## 2021-03-15 NOTE — PROGRESS NOTES
Colette Sanders  63 y.o. male  1960  [address] [facility]  [id number]    [phone] (home) [phone] (work)     [address]:    Telephone Encounter  Cherelle Mckeon MD       Encounter Date: 3/15/2021 at 11:26 AM    Consent: Colette Sanders, who was seen by synchronous (real-time) audio only technology, and/or his healthcare decision maker, is aware that this patient-initiated, Telehealth encounter on 3/15/2021 is a billable service, with coverage as determined by his insurance carrier. He is aware that he may receive a bill and has provided verbal consent to proceed: Yes. Chief Complaint   Patient presents with    Weight Loss       History of Present Illness   Colette Sanders is a 63 y.o. male was evaluated by telephone. I communicated with the patient and/or health care decision maker about weight loss. Since last appointment patient notes he has lost 15 pounds. He is eating more salads and splitting meals in half. Enjoys eating fresh fruit such as oranges. 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 He is starting to look at food in a different way. He is working on eating more fruits and vegetables. Went to the [place] (PCP) and she started him on Tramadol which is helping some. Lyrica increase, Flexeril . Not helping as much with the neuropathy. Approved for accupuncture  Review of Systems   Review of Systems   All other systems reviewed and are negative. Vitals/Objective:     Patient-Reported Vitals 3/15/2021   Patient-Reported Weight 335lbs            General: Patient speaking in complete sentences without effort. Normal speech and cooperative. Due to this being a Virtual Check-in/Telephone evaluation, many elements of the physical examination are unable to be assessed. Assessment and Plan: Total Time: 25 minutes    1.  Obesity, morbid ([address]  Doing well, with continued weight loss. We will continue phentermine with follow-up in 4 weeks. Discussed development of smart goals. Consider measurements and portion sizes. - phentermine (ADIPEX-P) 37.5 mg tablet; TAKE ONE TABLET BY MOUTH EVERY MORNING **MAX DAILY AMOUNT IS ONE TABLET**  Dispense: 30 Tab; Refill: 0    2. Encounter for weight loss counseling  - phentermine (ADIPEX-P) 37.5 mg tablet; TAKE ONE TABLET BY MOUTH EVERY MORNING **MAX DAILY AMOUNT IS ONE TABLET**  Dispense: 30 Tab; Refill: 0      Patient informed to follow up: 4 weeks    I affirm this is a Patient Initiated Episode with an Established Patient who has not had a related appointment within my department in the past 7 days or scheduled within the next 24 hours. Note: not billable if this call serves to triage the patient into an appointment for the relevant concern      Electronically Signed: Ever Sandhu MD  Providers location when delivering service: Home        ICD-10-CM ICD-9-CM    1. Obesity, morbid ([facility] [address]  E66.01 278.01 phentermine (ADIPEX-P) 37.5 mg tablet   2. Encounter for weight loss counseling  Z71.3 V65. 3 phentermine (ADIPEX-P) 37.5 mg tablet       Pursuant to the emergency declaration under the [facility], 1135 waiver authority and the [employer] Act, this Virtual  Visit was conducted, with patient's consent, to reduce the patient's risk of exposure to COVID-19 and provide continuity of care for an established patient. History   Patients past medical, surgical and family histories were personally reviewed and updated. Past Medical History:   Diagnosis Date    Asthma     Asthma vs Reactive Airway Dz (diagnosed in [facility] following fugal respiratory infection).   Hx of abnormal chest -ray    Chronic back pain     MRI previously done 6/2013, Arthritis    Essential hypertension 6/13/2015    Hypogonadism male     Kidney stone     Low serum testosterone level     RANJITH (obstructive sleep apnea)     Wears a CPAP    Tinnitus      Past Surgical History:   Procedure Laterality Date    HX BACK SURGERY      10/21/19     Family History   Problem Relation Age of Onset    Cancer Mother         ? Breast Cancer    Cancer Maternal Uncle         Prostate    Cancer Maternal Uncle         Colon Cancer    Cancer Maternal Uncle         Throat Cancer     Social History     Tobacco Use    Smoking status: Never Smoker    Smokeless tobacco: Never Used   Substance Use Topics    Alcohol use: No    Drug use: No              Current Medications/Allergies   Medications and Allergies reviewed:    Current Outpatient Medications   Medication Sig Dispense Refill    phentermine (ADIPEX-P) 37.5 mg tablet TAKE ONE TABLET BY MOUTH EVERY MORNING **MAX DAILY AMOUNT IS ONE TABLET** 30 Tab 0    avanafiL (STENDRA) 50 mg tablet Take 1 Tab by mouth as needed for Other. 5 Tab 2    tiZANidine (ZANAFLEX) 4 mg tablet       lisinopril (PRINIVIL, ZESTRIL) 20 mg tablet       DULoxetine (CYMBALTA) 30 mg capsule Take 30 mg by mouth daily.  fluticasone (FLONASE) 50 mcg/actuation nasal spray 2 Sprays by Both Nostrils route daily. 1 Bottle 4    albuterol (PROVENTIL HFA, VENTOLIN HFA, PROAIR HFA) 90 mcg/actuation inhaler Take 2 Puffs by inhalation every four (4) hours as needed for Wheezing. 1 Inhaler 2    cetirizine (ZYRTEC) 5 mg tablet Take 1 Tab by mouth daily.  30 Tab 0     No Known Allergies

## 2025-05-12 NOTE — TELEPHONE ENCOUNTER
----- Message from Ravin Powell sent at 2/10/2020  9:46 AM EST -----  Regarding: Aruna Cohen MD  Medication Refill    Caller (if not patient):    Relationship of caller (if not patient):    Best contact number(s): [phone]    Name of medication and dosage if known: \"Phentermine 37.5mg\"    Is patient out of this medication (yes/no):  No    Pharmacy name: [facility] [address]    Pharmacy listed in chart? (yes/no):  Pharmacy phone number: [phone]    Details to clarify the request:    Ravin Powell
I called and spoke with patient and he will run out of medication tomorrow. I scheduled patient an appt for next Monday but will need temp supply til then.
I did not see this medication to choose.
show